# Patient Record
Sex: MALE | Race: WHITE | Employment: FULL TIME | URBAN - METROPOLITAN AREA
[De-identification: names, ages, dates, MRNs, and addresses within clinical notes are randomized per-mention and may not be internally consistent; named-entity substitution may affect disease eponyms.]

---

## 2017-03-12 ENCOUNTER — HOSPITAL ENCOUNTER (OUTPATIENT)
Age: 52
Discharge: HOME | End: 2017-03-12
Attending: EMERGENCY MEDICINE
Payer: COMMERCIAL

## 2017-03-12 VITALS
OXYGEN SATURATION: 100 % | RESPIRATION RATE: 16 BRPM | DIASTOLIC BLOOD PRESSURE: 90 MMHG | TEMPERATURE: 97 F | SYSTOLIC BLOOD PRESSURE: 136 MMHG | HEART RATE: 65 BPM

## 2017-03-12 DIAGNOSIS — H11.32 SUBCONJUNCTIVAL HEMORRHAGE, LEFT: Primary | ICD-10-CM

## 2017-03-12 PROCEDURE — 99202 OFFICE O/P NEW SF 15 MIN: CPT

## 2017-03-12 NOTE — ED PROVIDER NOTES
Patient Seen in: [facility] In [address] [place] [address]    History   Patient presents with:   Eye Visual Problem (opthalmic)    Stated Complaint: lt eye redness    HPI    Patient is a 57-year-old male without significant past medical history who pre possible for a visit in 1 week  For Recheck      Medications Prescribed:  There are no discharge medications for this patient.

## 2020-12-24 ENCOUNTER — HOSPITAL ENCOUNTER (OUTPATIENT)
Age: 55
Discharge: HOME | End: 2020-12-24
Attending: EMERGENCY MEDICINE
Payer: COMMERCIAL

## 2020-12-24 VITALS
HEART RATE: 66 BPM | OXYGEN SATURATION: 100 % | RESPIRATION RATE: 18 BRPM | SYSTOLIC BLOOD PRESSURE: 154 MMHG | HEIGHT: 68.11 IN | DIASTOLIC BLOOD PRESSURE: 104 MMHG | TEMPERATURE: 98 F

## 2020-12-24 DIAGNOSIS — Z20.822 ENCOUNTER FOR LABORATORY TESTING FOR COVID-19 VIRUS: Primary | ICD-10-CM

## 2020-12-24 DIAGNOSIS — Z20.822 EXPOSURE TO COVID-19 VIRUS: ICD-10-CM

## 2020-12-24 PROCEDURE — 99202 OFFICE O/P NEW SF 15 MIN: CPT | Performed by: EMERGENCY MEDICINE

## 2020-12-24 NOTE — ED PROVIDER NOTES
Patient Seen in: [facility]      History   Patient presents with:  Covid-19 Test    Stated Complaint: EXPOSURE COVID    HPI  Patient presents requesting Covid testing. His daughter tested positive and received results yesterday.   Patient has range of motion. Lymphadenopathy:      Cervical: No cervical adenopathy. Skin:     General: Skin is warm and dry. Coloration: Skin is not pale. Neurological:      Mental Status: He is alert.       Coordination: Coordination normal.   Psychiatric:

## 2020-12-28 ENCOUNTER — TELEPHONE (OUTPATIENT)
Dept: INTERNAL MEDICINE CLINIC | Facility: CLINIC | Age: 55
End: 2020-12-28

## 2020-12-28 NOTE — TELEPHONE ENCOUNTER
Please convert to doximity visit as she has confirmed positive covid contacts 12/23/2020 with daughter and was tested for covid 12/24/2020.  We can still do an establish care visit/go over meds/pend any needed labs/care gaps etc unless she only wants to dis

## 2020-12-28 NOTE — TELEPHONE ENCOUNTER
S/W patient and explained Dr. Jenaro Sands message to patient. Repeated to patient that Dr. Reymundo Marsh will not see patient in the office because although his (patient's) COVID test was negative, he may still be positive from exposure to his daughter.  Patient jd

## 2021-10-14 ENCOUNTER — TELEPHONE (OUTPATIENT)
Dept: INTERNAL MEDICINE CLINIC | Facility: CLINIC | Age: 56
End: 2021-10-14

## 2021-10-14 NOTE — TELEPHONE ENCOUNTER
Attempted to contact patient to informed if Dr. Deena Laird was PCP.    No answer if patient returns call please ask if current pcp is Dr. Deena Laird or if he sees someone else

## 2021-11-12 ENCOUNTER — TELEPHONE (OUTPATIENT)
Dept: INTERNAL MEDICINE CLINIC | Facility: CLINIC | Age: 56
End: 2021-11-12

## 2021-11-12 NOTE — TELEPHONE ENCOUNTER
Unable to leave a voicemail will send a no response letter to the patient asking for a call back to verify if Dr. Shellie Clark is currently still his primary.

## 2021-12-10 ENCOUNTER — HOSPITAL ENCOUNTER (OUTPATIENT)
Age: 56
Discharge: HOME | End: 2021-12-10
Payer: COMMERCIAL

## 2021-12-10 VITALS
TEMPERATURE: 99 F | OXYGEN SATURATION: 98 % | SYSTOLIC BLOOD PRESSURE: 150 MMHG | RESPIRATION RATE: 16 BRPM | HEIGHT: 68.11 IN | HEART RATE: 75 BPM | WEIGHT: 174.19 LBS | BODY MASS INDEX: 26.4 KG/M2 | DIASTOLIC BLOOD PRESSURE: 95 MMHG

## 2021-12-10 DIAGNOSIS — R10.31 RIGHT GROIN PAIN: Primary | ICD-10-CM

## 2021-12-10 PROCEDURE — 99213 OFFICE O/P EST LOW 20 MIN: CPT | Performed by: NURSE PRACTITIONER

## 2021-12-14 NOTE — ED PROVIDER NOTES
Patient presents with:  Groin Pain      HPI:     Romana Holt is a 64year old male who presents with a chief complaint of intermittent right groin pain and swelling that started over 6 months ago.   He states he was seen by his primary care doctor, but never ha Reviewed.       Physical Exam:     BP (!) 150/95   Pulse 75   Temp 99.2 °F (37.3 °C) (Temporal)   Resp 16   Ht 173 cm (5' 8.11\")   Wt 79 kg   SpO2 98%   BMI 26.40 kg/m²   GENERAL: well developed, well nourished, well hydrated, no distress  SKIN: good skin for your condition today.     Follow Up with:  MD David Dean [address]  [facility]  [phone]    Schedule an appointment as soon as possible for a visit in 3 days

## 2022-01-07 ENCOUNTER — OFFICE VISIT (OUTPATIENT)
Dept: INTERNAL MEDICINE CLINIC | Facility: CLINIC | Age: 57
End: 2022-01-07
Payer: COMMERCIAL

## 2022-01-07 ENCOUNTER — LAB ENCOUNTER (OUTPATIENT)
Dept: LAB | Age: 57
End: 2022-01-07
Attending: INTERNAL MEDICINE
Payer: COMMERCIAL

## 2022-01-07 VITALS
HEIGHT: 68.11 IN | HEART RATE: 60 BPM | WEIGHT: 188 LBS | BODY MASS INDEX: 28.49 KG/M2 | DIASTOLIC BLOOD PRESSURE: 88 MMHG | SYSTOLIC BLOOD PRESSURE: 159 MMHG

## 2022-01-07 DIAGNOSIS — K40.21 BILATERAL RECURRENT INGUINAL HERNIA WITHOUT OBSTRUCTION OR GANGRENE: ICD-10-CM

## 2022-01-07 DIAGNOSIS — Z00.00 ANNUAL PHYSICAL EXAM: Primary | ICD-10-CM

## 2022-01-07 DIAGNOSIS — Z00.00 ANNUAL PHYSICAL EXAM: ICD-10-CM

## 2022-01-07 DIAGNOSIS — Z12.11 COLON CANCER SCREENING: ICD-10-CM

## 2022-01-07 LAB
ALBUMIN SERPL-MCNC: 3.4 G/DL (ref 3.4–5)
ALBUMIN/GLOB SERPL: 0.8 {RATIO} (ref 1–2)
ALP LIVER SERPL-CCNC: 50 U/L
ALT SERPL-CCNC: 40 U/L
ANION GAP SERPL CALC-SCNC: 4 MMOL/L (ref 0–18)
AST SERPL-CCNC: 17 U/L (ref 15–37)
BILIRUB SERPL-MCNC: 0.3 MG/DL (ref 0.1–2)
BUN BLD-MCNC: 16 MG/DL (ref 7–18)
BUN/CREAT SERPL: 14.3 (ref 10–20)
CALCIUM BLD-MCNC: 8.5 MG/DL (ref 8.5–10.1)
CHLORIDE SERPL-SCNC: 107 MMOL/L (ref 98–112)
CHOLEST SERPL-MCNC: 195 MG/DL (ref ?–200)
CO2 SERPL-SCNC: 31 MMOL/L (ref 21–32)
COMPLEXED PSA SERPL-MCNC: 2.46 NG/ML (ref ?–4)
CREAT BLD-MCNC: 1.12 MG/DL
DEPRECATED RDW RBC AUTO: 39.5 FL (ref 35.1–46.3)
ERYTHROCYTE [DISTWIDTH] IN BLOOD BY AUTOMATED COUNT: 12.6 % (ref 11–15)
EST. AVERAGE GLUCOSE BLD GHB EST-MCNC: 128 MG/DL (ref 68–126)
FASTING PATIENT LIPID ANSWER: NO
FASTING STATUS PATIENT QL REPORTED: NO
GLOBULIN PLAS-MCNC: 4.3 G/DL (ref 2.8–4.4)
GLUCOSE BLD-MCNC: 144 MG/DL (ref 70–99)
HBA1C MFR BLD: 6.1 % (ref ?–5.7)
HCT VFR BLD AUTO: 44.5 %
HDLC SERPL-MCNC: 58 MG/DL (ref 40–59)
HGB BLD-MCNC: 14.1 G/DL
LDLC SERPL CALC-MCNC: 103 MG/DL (ref ?–100)
MCH RBC QN AUTO: 27.3 PG (ref 26–34)
MCHC RBC AUTO-ENTMCNC: 31.7 G/DL (ref 31–37)
MCV RBC AUTO: 86.1 FL
NONHDLC SERPL-MCNC: 137 MG/DL (ref ?–130)
OSMOLALITY SERPL CALC.SUM OF ELEC: 298 MOSM/KG (ref 275–295)
PLATELET # BLD AUTO: 281 10(3)UL (ref 150–450)
POTASSIUM SERPL-SCNC: 4.3 MMOL/L (ref 3.5–5.1)
PROT SERPL-MCNC: 7.7 G/DL (ref 6.4–8.2)
RBC # BLD AUTO: 5.17 X10(6)UL
SODIUM SERPL-SCNC: 142 MMOL/L (ref 136–145)
TRIGL SERPL-MCNC: 202 MG/DL (ref 30–149)
VLDLC SERPL CALC-MCNC: 34 MG/DL (ref 0–30)
WBC # BLD AUTO: 11.4 X10(3) UL (ref 4–11)

## 2022-01-07 PROCEDURE — 83036 HEMOGLOBIN GLYCOSYLATED A1C: CPT

## 2022-01-07 PROCEDURE — 3008F BODY MASS INDEX DOCD: CPT | Performed by: INTERNAL MEDICINE

## 2022-01-07 PROCEDURE — 99386 PREV VISIT NEW AGE 40-64: CPT | Performed by: INTERNAL MEDICINE

## 2022-01-07 PROCEDURE — 80061 LIPID PANEL: CPT

## 2022-01-07 PROCEDURE — 3077F SYST BP >= 140 MM HG: CPT | Performed by: INTERNAL MEDICINE

## 2022-01-07 PROCEDURE — 85027 COMPLETE CBC AUTOMATED: CPT

## 2022-01-07 PROCEDURE — 80053 COMPREHEN METABOLIC PANEL: CPT

## 2022-01-07 PROCEDURE — 36415 COLL VENOUS BLD VENIPUNCTURE: CPT

## 2022-01-07 PROCEDURE — 3079F DIAST BP 80-89 MM HG: CPT | Performed by: INTERNAL MEDICINE

## 2022-01-07 NOTE — PROGRESS NOTES
History of Present Illness   Patient ID: Di Feliciano is a 64year old male. Chief Complaint: Physical      Di Feliciano is a pleasant 64year old male who presents for annual physical exam. Di Feliciano is doing well today.  Hernia noted in ED, n present. Hernia is present in the left inguinal area and right inguinal area. Musculoskeletal:         General: Normal range of motion. Cervical back: Normal range of motion and neck supple. Skin:     Coloration: Skin is not jaundiced.    Neuro 195 mg/dL    Medical History    Reviewed allergies:  No Known Allergies     Reviewed: There are no problems to display for this patient. Reviewed:  History reviewed. No pertinent past medical history. Reviewed:  History reviewed.  No pertinent family questions answered to best of ability. Call office with any questions. Seek emergency care if necessary. Patient understands and agrees to follow directions and advice.       ----------------------------------------- PATIENT INSTRUCTIONS----------------

## 2022-01-11 DIAGNOSIS — Z13.6 SCREENING, ISCHEMIC HEART DISEASE: Primary | ICD-10-CM

## 2022-01-22 ENCOUNTER — HOSPITAL ENCOUNTER (OUTPATIENT)
Dept: CT IMAGING | Age: 57
Discharge: HOME | End: 2022-01-22
Attending: INTERNAL MEDICINE
Payer: COMMERCIAL

## 2022-01-22 DIAGNOSIS — K40.21 BILATERAL RECURRENT INGUINAL HERNIA WITHOUT OBSTRUCTION OR GANGRENE: ICD-10-CM

## 2022-01-22 PROCEDURE — 74176 CT ABD & PELVIS W/O CONTRAST: CPT | Performed by: INTERNAL MEDICINE

## 2022-01-25 DIAGNOSIS — K40.90 UNILATERAL INGUINAL HERNIA WITHOUT OBSTRUCTION OR GANGRENE, RECURRENCE NOT SPECIFIED: Primary | ICD-10-CM

## 2022-02-02 ENCOUNTER — OFFICE VISIT (OUTPATIENT)
Dept: SURGERY | Facility: CLINIC | Age: 57
End: 2022-02-02
Payer: COMMERCIAL

## 2022-02-02 VITALS — HEIGHT: 67 IN | BODY MASS INDEX: 29.19 KG/M2 | WEIGHT: 186 LBS

## 2022-02-02 DIAGNOSIS — K40.20 NON-RECURRENT BILATERAL INGUINAL HERNIA WITHOUT OBSTRUCTION OR GANGRENE: Primary | ICD-10-CM

## 2022-02-02 PROCEDURE — 99244 OFF/OP CNSLTJ NEW/EST MOD 40: CPT | Performed by: SURGERY

## 2022-02-02 PROCEDURE — 3008F BODY MASS INDEX DOCD: CPT | Performed by: SURGERY

## 2022-02-02 RX ORDER — MULTIVIT-MIN/IRON/FOLIC ACID/K 18-600-40
CAPSULE ORAL
COMMUNITY

## 2022-02-02 RX ORDER — OMEGA-3S/DHA/EPA/FISH OIL/D3 1150-1000
LIQUID (ML) ORAL DAILY
COMMUNITY

## 2022-02-07 ENCOUNTER — TELEPHONE (OUTPATIENT)
Dept: INTERNAL MEDICINE CLINIC | Facility: CLINIC | Age: 57
End: 2022-02-07

## 2022-02-07 NOTE — TELEPHONE ENCOUNTER
Patient was recommended to have office visit with Dr. Braxton Dozier, per OV notes 2/2/22:     I would like him to get some primary care follow up first - rectal digital exam and bone scan and formal Urology assessment are considerations    Wife calling to schedule visit, offered appointment tomorrow but wife declined, scheduled for the first available per patient availability.      Future Appointments   Date Time Provider Armando White   2/12/2022  9:40 AM MD LILIANA Rees

## 2022-02-12 ENCOUNTER — OFFICE VISIT (OUTPATIENT)
Dept: INTERNAL MEDICINE CLINIC | Facility: CLINIC | Age: 57
End: 2022-02-12
Payer: COMMERCIAL

## 2022-02-12 ENCOUNTER — LAB ENCOUNTER (OUTPATIENT)
Dept: LAB | Age: 57
End: 2022-02-12
Attending: INTERNAL MEDICINE
Payer: COMMERCIAL

## 2022-02-12 ENCOUNTER — HOSPITAL ENCOUNTER (OUTPATIENT)
Dept: GENERAL RADIOLOGY | Age: 57
Discharge: HOME | End: 2022-02-12
Attending: INTERNAL MEDICINE
Payer: COMMERCIAL

## 2022-02-12 VITALS
BODY MASS INDEX: 28.25 KG/M2 | HEIGHT: 67 IN | HEART RATE: 65 BPM | DIASTOLIC BLOOD PRESSURE: 90 MMHG | WEIGHT: 180 LBS | SYSTOLIC BLOOD PRESSURE: 147 MMHG

## 2022-02-12 DIAGNOSIS — M89.9 BONE LESION: ICD-10-CM

## 2022-02-12 DIAGNOSIS — M89.9 BONE LESION: Primary | ICD-10-CM

## 2022-02-12 DIAGNOSIS — K40.21 BILATERAL RECURRENT INGUINAL HERNIA WITHOUT OBSTRUCTION OR GANGRENE: ICD-10-CM

## 2022-02-12 DIAGNOSIS — R35.0 FREQUENT URINATION: ICD-10-CM

## 2022-02-12 LAB
BILIRUB UR QL: NEGATIVE
CLARITY UR: CLEAR
COLOR UR: YELLOW
GLUCOSE UR-MCNC: NEGATIVE MG/DL
HGB UR QL STRIP.AUTO: NEGATIVE
KETONES UR-MCNC: NEGATIVE MG/DL
LEUKOCYTE ESTERASE UR QL STRIP.AUTO: NEGATIVE
NITRITE UR QL STRIP.AUTO: NEGATIVE
PH UR: 6 [PH] (ref 5–8)
PROT UR-MCNC: NEGATIVE MG/DL
SP GR UR STRIP: 1.02 (ref 1–1.03)
UROBILINOGEN UR STRIP-ACNC: <2

## 2022-02-12 PROCEDURE — 81003 URINALYSIS AUTO W/O SCOPE: CPT | Performed by: INTERNAL MEDICINE

## 2022-02-12 PROCEDURE — 3008F BODY MASS INDEX DOCD: CPT | Performed by: INTERNAL MEDICINE

## 2022-02-12 PROCEDURE — 99214 OFFICE O/P EST MOD 30 MIN: CPT | Performed by: INTERNAL MEDICINE

## 2022-02-12 PROCEDURE — 72190 X-RAY EXAM OF PELVIS: CPT | Performed by: INTERNAL MEDICINE

## 2022-02-12 PROCEDURE — 3080F DIAST BP >= 90 MM HG: CPT | Performed by: INTERNAL MEDICINE

## 2022-02-12 PROCEDURE — 3077F SYST BP >= 140 MM HG: CPT | Performed by: INTERNAL MEDICINE

## 2022-02-22 ENCOUNTER — PATIENT MESSAGE (OUTPATIENT)
Dept: INTERNAL MEDICINE CLINIC | Facility: CLINIC | Age: 57
End: 2022-02-22

## 2022-03-15 ENCOUNTER — HOSPITAL ENCOUNTER (OUTPATIENT)
Dept: NUCLEAR MEDICINE | Facility: HOSPITAL | Age: 57
Discharge: HOME | End: 2022-03-15
Attending: INTERNAL MEDICINE
Payer: COMMERCIAL

## 2022-03-15 DIAGNOSIS — M89.9 BONE LESION: ICD-10-CM

## 2022-03-15 PROCEDURE — 78315 BONE IMAGING 3 PHASE: CPT | Performed by: INTERNAL MEDICINE

## 2022-04-01 ENCOUNTER — OFFICE VISIT (OUTPATIENT)
Dept: SURGERY | Facility: CLINIC | Age: 57
End: 2022-04-01
Payer: COMMERCIAL

## 2022-04-01 DIAGNOSIS — N13.8 BPH WITH OBSTRUCTION/LOWER URINARY TRACT SYMPTOMS: Primary | ICD-10-CM

## 2022-04-01 DIAGNOSIS — N40.1 BPH WITH OBSTRUCTION/LOWER URINARY TRACT SYMPTOMS: Primary | ICD-10-CM

## 2022-04-01 PROCEDURE — 99244 OFF/OP CNSLTJ NEW/EST MOD 40: CPT | Performed by: UROLOGY

## 2022-04-19 ENCOUNTER — HOSPITAL ENCOUNTER (OUTPATIENT)
Dept: ULTRASOUND IMAGING | Age: 57
Discharge: HOME | End: 2022-04-19
Attending: UROLOGY
Payer: COMMERCIAL

## 2022-04-19 DIAGNOSIS — N40.1 BPH WITH OBSTRUCTION/LOWER URINARY TRACT SYMPTOMS: ICD-10-CM

## 2022-04-19 DIAGNOSIS — N13.8 BPH WITH OBSTRUCTION/LOWER URINARY TRACT SYMPTOMS: ICD-10-CM

## 2022-04-19 PROCEDURE — 76857 US EXAM PELVIC LIMITED: CPT | Performed by: UROLOGY

## 2022-04-29 ENCOUNTER — OFFICE VISIT (OUTPATIENT)
Dept: SURGERY | Facility: CLINIC | Age: 57
End: 2022-04-29
Payer: COMMERCIAL

## 2022-04-29 VITALS — WEIGHT: 180 LBS | HEIGHT: 67 IN | BODY MASS INDEX: 28.25 KG/M2

## 2022-04-29 DIAGNOSIS — K40.20 BILATERAL INGUINAL HERNIA WITHOUT OBSTRUCTION OR GANGRENE, RECURRENCE NOT SPECIFIED: Primary | ICD-10-CM

## 2022-04-29 DIAGNOSIS — K40.20 NON-RECURRENT BILATERAL INGUINAL HERNIA WITHOUT OBSTRUCTION OR GANGRENE: ICD-10-CM

## 2022-04-29 PROCEDURE — 99214 OFFICE O/P EST MOD 30 MIN: CPT | Performed by: SURGERY

## 2022-04-29 PROCEDURE — 3008F BODY MASS INDEX DOCD: CPT | Performed by: SURGERY

## 2022-05-10 ENCOUNTER — TELEPHONE (OUTPATIENT)
Dept: SURGERY | Facility: CLINIC | Age: 57
End: 2022-05-10

## 2022-05-10 NOTE — TELEPHONE ENCOUNTER
Pt's [place] calling to clarify date for surgery, please advise to spouse Jenna [facility]#[phone].

## 2022-05-11 NOTE — TELEPHONE ENCOUNTER
I called and spoke to Olga, pt's spouse. Gave surgery date and answered all questions, re: when to have covid test and what time is surgery.

## 2022-05-16 ENCOUNTER — TELEPHONE (OUTPATIENT)
Dept: SURGERY | Facility: CLINIC | Age: 57
End: 2022-05-16

## 2022-05-16 NOTE — TELEPHONE ENCOUNTER
It was explained to the patient there were no orders for the patient, and that the pre-surgical nurse will call to assist him in making an appointment for his COVID test.  Verbal understanding received.

## 2022-05-16 NOTE — TELEPHONE ENCOUNTER
Gloria Krabbe from lab in [place] states patient is in office right now trying to get lab orders done before surgery.  Please advise

## 2022-05-17 ENCOUNTER — LAB REQUISITION (OUTPATIENT)
Dept: SURGERY | Age: 57
End: 2022-05-17
Payer: COMMERCIAL

## 2022-05-17 DIAGNOSIS — Z01.818 PREOP EXAMINATION: ICD-10-CM

## 2022-05-18 LAB — SARS-COV-2 RNA RESP QL NAA+PROBE: NOT DETECTED

## 2022-05-19 ENCOUNTER — VIRTUAL CHECK-IN (OUTPATIENT)
Dept: SURGERY | Facility: CLINIC | Age: 57
End: 2022-05-19

## 2022-05-19 DIAGNOSIS — K40.20 NON-RECURRENT BILATERAL INGUINAL HERNIA WITHOUT OBSTRUCTION OR GANGRENE: Primary | ICD-10-CM

## 2022-05-19 RX ORDER — TRAMADOL HYDROCHLORIDE 50 MG/1
50 TABLET ORAL EVERY 6 HOURS PRN
Qty: 16 TABLET | Refills: 0 | Status: SHIPPED | OUTPATIENT
Start: 2022-05-19

## 2022-05-19 RX ORDER — OXYCODONE HYDROCHLORIDE 5 MG/1
5 TABLET ORAL EVERY 6 HOURS PRN
Qty: 10 TABLET | Refills: 0 | Status: SHIPPED | OUTPATIENT
Start: 2022-05-19

## 2022-06-01 ENCOUNTER — OFFICE VISIT (OUTPATIENT)
Dept: SURGERY | Facility: CLINIC | Age: 57
End: 2022-06-01
Payer: COMMERCIAL

## 2022-06-01 DIAGNOSIS — K40.20 NON-RECURRENT BILATERAL INGUINAL HERNIA WITHOUT OBSTRUCTION OR GANGRENE: Primary | ICD-10-CM

## 2022-06-01 PROCEDURE — 99024 POSTOP FOLLOW-UP VISIT: CPT | Performed by: SURGERY

## 2022-06-01 NOTE — PROGRESS NOTES
Postoperative Patient Follow-up      6/1/2022    Di Feliciano 62year old      HPI  Patient presents with:  Post-Op: S/P bilateral inguinal hernia repair with mesh on 5/19/22. Pt denies pain, drainage, fever, bowel/bladder problems. Exam  ABd soft and nt, nd, incisions clean and dry, normal healing ridge, repair intact, min ecchymosis and swelling. Assessment/Plan  Assessment   Non-recurrent bilateral inguinal hernia without obstruction or gangrene  (primary encounter diagnosis)    Steady progress following mesh repair of bilateral large indirect inguinal hernias. OK for RTW 6/2/22, limited lifting until 7/1/22. Medical follow up planned - another CT in July/2022 recommended for \"tatianna mesentery\" seen on previous CT. LGI screening and urology follow up encouraged.          Lalita Anthony MD

## (undated) NOTE — LETTER
Patient: Harry Yates   YOB: 1965   Date of Visit: 6/1/2022     Dear  Dr. Lexis Hooker MD,    Thank you for referring Harry Yates to my practice. Please find my assessment and plan below. Non-recurrent bilateral inguinal hernia without obstruction or gangrene  (primary encounter diagnosis)    Steady progress following mesh repair of bilateral large indirect inguinal hernias. OK for RTW 6/2/22, limited lifting until 7/1/22. Medical follow up planned - another CT in July/2022 recommended for \"tatianna mesentery\" seen on previous CT. LGI screening and urology follow up encouraged.       Sincerely,   Bernadette Lazcano MD     Document electronically generated by:  Bernadette Lazcano MD

## (undated) NOTE — LETTER
Date & Time: 12/10/2021, 11:00 AM  Patient: Saulo Campos  Encounter Provider(s):    ISAAC Chan       To Whom It May Concern:    Saulo Campos was seen and treated in our department on 12/10/2021.  He may not lift over 5lbs until cleared by

## (undated) NOTE — LETTER
4/29/2022              [facility]         Dear Dr Jackie Ruvalcaba is scheduled for a bilateral inguinal hernia repair with mesh on 5/19/22. Please provide medical clearance.       Sincerely,    Lalita Anthony MD  [facility] [place] [id number]  [phone]        Document electronically generated by:  Diana Abebe RN

## (undated) NOTE — ED AVS SNAPSHOT
[facility] in [facility] [address].  [address] [facility]    Phone:  [phone]    Fax:  [phone]           Maria Elena Sotero   MRN: [id number]    Department:  [facility] in [address] [place] [address]   Date of Visit: service to you, we would appreciate any positive or negative feedback related to the care you received in our Immediate Care. Please call our [address] at [phone]. Your Immediate Care team is here to serve you. You are our top priority.     You w [address] [facility]  WEEKENDS AND HOLIDAYS 8AM - 6PM    I certified that I have received a copy of the aftercare instructions; that these instructions have been explained to me; all questions pertaining to these instructions have been answered information, go to [email or web address] and click on the Sign Up Now link in the [employer] box. Enter your [employer] Activation Code exactly as it appears below along with your Zip Code and Date of Birth to complete the sign-up process.  If you do

## (undated) NOTE — LETTER
6/1/2022          To Whom It May Concern:    Jenifer Cunningham may return to work effective June 2 Light duty:  No lifting over 15 lbs and no strenuous activity until July 1st.  He may be regular duty at that time. .     If you require additional information please contact our office.         Sincerely,          Monika Ramírez MD          Document generated by:  NW

## (undated) NOTE — LETTER
Patient: Salena De Leon   YOB: 1965   Date of Visit: 2/2/2022     Dear  Dr. Osiris Dixon MD,    Thank you for referring Salena De Leon to my practice. Please find my assessment and plan below. Non-recurrent bilateral inguinal hernia without obstruction or gangrene  (primary encounter diagnosis)    62year old male with bilateral inguinal hernias, fairly large/scrotal hernia on R, maybe more symptomatic on the left. Mildly elevated screening labs as above. Sclerotic bone lesions noted in the pelvis as well as \"tatianna mesentery\" or panniculitis. He seems to have some urinary obstructive sx - frequency, elevated glucose may be contributing. Discussed in detail with patient and spouse, options reviewed, literature provided. He will likely benefit from \"bilateral inguinal hernia repair with mesh\" - open, general, SA, 1.5 hours, [facility] or [facility]. They understand risks. I would like him to get some primary care follow up first - rectal digital exam and bone scan and formal Urology assessment are considerations. He may call to schedule hernia repair when these issues are addressed. Activity at work and at home as tolerated for now, no restrictions - use pain as a guide. Serum glucose monitoring as planned, encouraged LGI screening eventually, repeat ABD/PELVIC CT with PO and IV contrast in July 2022 to follow up on mesenteric panniculitis.         Sincerely,   Serge Williamson MD     Document electronically generated by:  Serge Williamson MD

## (undated) NOTE — LETTER
11/12/2021              Simba Perez        [address] [facility]         Dear Lisa Ford,    This letter is to inform you that our office has made several attempts to reach you by phone without success.   We were attempting to conta